# Patient Record
Sex: MALE | Race: WHITE
[De-identification: names, ages, dates, MRNs, and addresses within clinical notes are randomized per-mention and may not be internally consistent; named-entity substitution may affect disease eponyms.]

---

## 2021-02-07 ENCOUNTER — HOSPITAL ENCOUNTER (EMERGENCY)
Dept: HOSPITAL 61 - ER | Age: 81
LOS: 1 days | Discharge: HOME | End: 2021-02-08
Payer: MEDICARE

## 2021-02-07 VITALS — WEIGHT: 270.51 LBS | HEIGHT: 72 IN | BODY MASS INDEX: 36.64 KG/M2

## 2021-02-07 DIAGNOSIS — M25.552: Primary | ICD-10-CM

## 2021-02-07 DIAGNOSIS — I10: ICD-10-CM

## 2021-02-07 DIAGNOSIS — E66.9: ICD-10-CM

## 2021-02-07 LAB
ACETAMIN: < 2 MCG/ML (ref 10–30)
ALBUMIN SERPL-MCNC: 3.2 G/DL (ref 3.4–5)
ALBUMIN/GLOB SERPL: 0.9 {RATIO} (ref 1–1.7)
ALP SERPL-CCNC: 83 U/L (ref 46–116)
ALT SERPL-CCNC: 72 U/L (ref 16–63)
AMPHETAMINE/METHAMPHETAMINE: (no result)
ANION GAP SERPL CALC-SCNC: 7 MMOL/L (ref 6–14)
APTT PPP: YELLOW S
AST SERPL-CCNC: 43 U/L (ref 15–37)
BACTERIA #/AREA URNS HPF: 0 /HPF
BARBITURATES UR-MCNC: (no result) UG/ML
BASOPHILS # BLD AUTO: 0 X10^3/UL (ref 0–0.2)
BASOPHILS NFR BLD: 1 % (ref 0–3)
BENZODIAZ UR-MCNC: (no result) UG/L
BILIRUB SERPL-MCNC: 0.8 MG/DL (ref 0.2–1)
BILIRUB UR QL STRIP: NEGATIVE
BUN SERPL-MCNC: 28 MG/DL (ref 8–26)
BUN/CREAT SERPL: 23 (ref 6–20)
CALCIUM SERPL-MCNC: 8.8 MG/DL (ref 8.5–10.1)
CANNABINOIDS UR-MCNC: (no result) UG/L
CHLORIDE SERPL-SCNC: 110 MMOL/L (ref 98–107)
CO2 SERPL-SCNC: 26 MMOL/L (ref 21–32)
COCAINE UR-MCNC: (no result) NG/ML
CREAT SERPL-MCNC: 1.2 MG/DL (ref 0.7–1.3)
EOSINOPHIL NFR BLD: 0.1 X10^3/UL (ref 0–0.7)
EOSINOPHIL NFR BLD: 1 % (ref 0–3)
ERYTHROCYTE [DISTWIDTH] IN BLOOD BY AUTOMATED COUNT: 17.9 % (ref 11.5–14.5)
FIBRINOGEN PPP-MCNC: CLEAR MG/DL
GFR SERPLBLD BASED ON 1.73 SQ M-ARVRAT: 58.3 ML/MIN
GLUCOSE SERPL-MCNC: 114 MG/DL (ref 70–99)
HCT VFR BLD CALC: 39 % (ref 39–53)
HGB BLD-MCNC: 13 G/DL (ref 13–17.5)
LYMPHOCYTES # BLD: 0.6 X10^3/UL (ref 1–4.8)
LYMPHOCYTES NFR BLD AUTO: 7 % (ref 24–48)
MCH RBC QN AUTO: 30 PG (ref 25–35)
MCHC RBC AUTO-ENTMCNC: 33 G/DL (ref 31–37)
MCV RBC AUTO: 90 FL (ref 79–100)
METHADONE SERPL-MCNC: (no result) NG/ML
MONO #: 0.8 X10^3/UL (ref 0–1.1)
MONOCYTES NFR BLD: 9 % (ref 0–9)
NEUT #: 7.1 X10^3/UL (ref 1.8–7.7)
NEUTROPHILS NFR BLD AUTO: 82 % (ref 31–73)
NITRITE UR QL STRIP: NEGATIVE
OPIATES UR-MCNC: (no result) NG/ML
PCP SERPL-MCNC: (no result) MG/DL
PH UR STRIP: 6 [PH]
PLATELET # BLD AUTO: 126 X10^3/UL (ref 140–400)
POTASSIUM SERPL-SCNC: 4.3 MMOL/L (ref 3.5–5.1)
PROT SERPL-MCNC: 6.8 G/DL (ref 6.4–8.2)
PROT UR STRIP-MCNC: 100 MG/DL
RBC # BLD AUTO: 4.34 X10^6/UL (ref 4.3–5.7)
RBC #/AREA URNS HPF: (no result) /HPF (ref 0–2)
SODIUM SERPL-SCNC: 143 MMOL/L (ref 136–145)
UROBILINOGEN UR-MCNC: 1 MG/DL
WBC # BLD AUTO: 8.7 X10^3/UL (ref 4–11)
WBC #/AREA URNS HPF: 0 /HPF (ref 0–4)

## 2021-02-07 PROCEDURE — 73502 X-RAY EXAM HIP UNI 2-3 VIEWS: CPT

## 2021-02-07 PROCEDURE — 99285 EMERGENCY DEPT VISIT HI MDM: CPT

## 2021-02-07 PROCEDURE — 81001 URINALYSIS AUTO W/SCOPE: CPT

## 2021-02-07 PROCEDURE — 80053 COMPREHEN METABOLIC PANEL: CPT

## 2021-02-07 PROCEDURE — 85025 COMPLETE CBC W/AUTO DIFF WBC: CPT

## 2021-02-07 PROCEDURE — 36415 COLL VENOUS BLD VENIPUNCTURE: CPT

## 2021-02-07 PROCEDURE — G0480 DRUG TEST DEF 1-7 CLASSES: HCPCS

## 2021-02-07 PROCEDURE — 80307 DRUG TEST PRSMV CHEM ANLYZR: CPT

## 2021-02-07 PROCEDURE — 80329 ANALGESICS NON-OPIOID 1 OR 2: CPT

## 2021-02-07 NOTE — RAD
Exam: Left hip 2 views



INDICATION: Left hip pain



TECHNIQUE: Frontal view of pelvis with frontal and frog-leg lateral views left hip



Comparisons: None



FINDINGS:

Diffuse osteopenia. No acute or healed fractures. Soft tissues are unremarkable. Joint spaces are wel
l-maintained.



IMPRESSION:

No acute osseous abnormality. If the Patient is acutely unable to bear weight MRI to rule out occult 
hip fracture is recommended.



Electronically signed by: Beatrice Machuca MD (2/7/2021 11:30 PM) SYD

## 2021-02-07 NOTE — PHYS DOC
General Adult


EDM:


Chief Complaint:  HIP PAIN





HPI:


HPI:


79 yo M PMH HTN and obesity presents to the ed with c/o sharp, nonradiating left

hippain worsening for the past week, no relifef with hydrocodone x 3days. EMS 

reports pt was ambulatory at his home, in no distress. Pt reports no trauma or 

history of trauma. 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 On no AC, only 4 bp medications

(I reviewed these bottles at bedside).  No history of IV drug use, alcohol abuse

or immunocompromise state including diabetes or chronic steroid use.  Denies any

associated fever or chills, swollen joints, urethral discharge, dysuria or 

hematuria, saddle anesthesia, urinary or bowel retention or incontinence.





Review of Systems:


Review of Systems:


Constitutional:   Denies fever or chills. []


Eyes:   Denies change in visual acuity. []


HENT:   Denies nasal congestion or sore throat. [] 


Respiratory:   Denies cough or shortness of breath. [] 


Cardiovascular:   Denies chest pain or edema. [] 


GI:   Denies abdominal pain, nausea, vomiting, bloody stools or diarrhea. [] 


:  Denies dysuria. [] 


Musculoskeletal:   Denies back pain or joint swelling


Integument:   Denies rash. [] 


Neurologic:   Denies headache, focal weakness or sensory changes. [] 


Endocrine:   Denies polyuria or polydipsia. [] 


Lymphatic:  Denies swollen glands. [] 


Psychiatric:  Denies depression or anxiety. []





Heart Score:


Risk Factors:


Risk Factors:  DM, Current or recent (<one month) smoker, HTN, HLP, family 

history of CAD, obesity.


Risk Scores:


Score 0 - 3:  2.5% MACE over next 6 weeks - Discharge Home


Score 4 - 6:  20.3% MACE over next 6 weeks - Admit for Clinical Observation


Score 7 - 10:  72.7% MACE over next 6 weeks - Early Invasive Strategies





Physical Exam:


PE:





Constitutional: Well developed, well nourished, no acute distress/comfortable 

appearing, non-toxic appearance, obese


HENT: Normocephalic, atraumatic,


Eyes: EOMI, conjunctiva normal, no discharge.  


Neck: Normal range of motion,  supple, 


Cardiovascular: S1/2 present, regular rhythm


Lungs & Thorax: Speaking in full sentences, bilateral equal chest rise, no 

tachypnea or increased work of breathing


Abdomen:  soft, no tenderness, obese


Skin: Warm, dry, no erythema, no rash. [] 


Back: No midline tenderness, no CVA tenderness. [] 


Extremities: No focal or reproducible tenderness, no cyanosis, no deformity, 

straight leg negative bilaterally, patient rolls on both sides, no distress from

 lying left lateral recumbent, bl 1/4 edema, normal DP/PT pulses, cap refill < 1

 second


Neurologic: Alert and oriented X 3, normal motor function, normal sensory 

function, no focal deficits noted. []


Psychologic: Affect normal, judgement normal, mood normal. []





EKG:


EKG:


[]





Radiology/Procedures:


Radiology/Procedures:


[]IMAGING REPORT





                                     Signed





PATIENT: JESSICA REYNOSO ACCOUNT: [id number]     MRN#: [id number]


: 1940           LOCATION: ER              AGE: 80


SEX: M                    EXAM DT: 21         ACCESSION#: [id number]


STATUS: PRE ER            ORD. PHYSICIAN: AQUILES AGUILAR DO


REASON: left hip pain


PROCEDURE: HIP LEFT 2V WITH PELVIS





Exam: Left hip 2 views





INDICATION: Left hip pain





TECHNIQUE: Frontal view of pelvis with frontal and frog-leg lateral views left 

hip





Comparisons: None





FINDINGS:


Diffuse osteopenia. No acute or healed fractures. Soft tissues are unremarkable.

 Joint spaces are well-maintained.





IMPRESSION:


No acute osseous abnormality. If the Patient is acutely unable to bear weight 

MRI to rule out occult hip fracture is recommended.





Electronically signed by: Beatrice Grace MD (2021 11:30 PM) [facility]














DICTATED and SIGNED BY:     BEATRICE GRACE MD


DATE:     21 [id number] 0





Course & Med Decision Making:


Course & Med Decision Making


Pertinent Labs and Imaging studies reviewed. (See chart for details)





Concern for atraumatic worsening left hip pain in an obese patient who is able 

to bear weight and lean on left hip.  Labs with no leukocytosis.  Patient 

afebrile.  Nonspecific transaminitis.  Normal renal function.  Drug screen 

positive for opioids, consistent with history.  Urinalysis with no infection.  

Patient with no neurologic deficits, saddle anesthesia, radiculopathy, urine or 

bowel retention or incontinence.  Patient in no active discomfort.  Will treat 

conservatively with anti-inflammatory medications.  Will discharge home with 

strict ED return precautions were given for saddle anesthesia, neurologic 

deficits, midline back pain, fever or urinary or bowel retention or 

incontinence.. Encouraged urgent outpatient follow-up with PMD and orthopedic 

surgery for definitive management, consider MRI imaging.  Life-threatening 

processes were considered but are low suspicion at this time, given history, 

physical exam and ED workup. Pt was educated on all prescription medications and

 adverse effects.  All patient's questions were answered and pt was stable at 

time of discharge.





Life/limb-threatening differential includes but is not limited to, avascular 

necrosis, septic arthritis, malignancy, compartment syndrome, 

fracture/ligamentous injury/overuse, decompression sickness, seronegative 

spondyloarthropathies, trauma including dislocation/fracture, Lyme disease, 

lupus, arthritis differentials, gout/pseudogout or decompression sickness. 





I spoken with the patient and her caregivers.  I explained the patient's conditi

on, diagnoses and treatment plan based on the information available to me at 

this time.  I have answered the patient and her caregiver's questions and 

addressed any concerns.  The patient and her caregivers have a good 

understanding of patient's diagnosis, condition and treatment plan as can be 

expected at this point.  Vital signs have been stable.  Patient's condition is 

stable and appropriate for discharge from the emergency department. 





Patient will pursue further outpatient evaluation with primary care physician or

 other designated or consulting physician as outlined in the discharge 

instructions.  The patient and/or caregivers are agreeable to this plan of care 

and follow-up instructions have been explained in detail.  The patient and/or 

caregivers have received these instructions in written form and have expressed 

an understanding of the discharge instructions.  The patient and/or caregivers 

are aware that any significant change of condition or worsening of symptoms 

should prompt immediate return to this or the closest emergency department or 

call to 911.





Gloria Disclaimer:


Dragon Disclaimer:


This electronic medical record was generated, in whole or in part, using a voice

 recognition dictation system.





Departure


Departure


Impression:  


   Primary Impression:  


   Left hip pain


Disposition:  01 DC HOME SELF CARE/HOMELESS


Condition:  STABLE


Referrals:  


ARISTEO JHAVERI MD


FOLLOW UP WITH FAMILY MEDICINE:





Family Medicine


Address:


[address] [facility] [address]


[place] [address]


Phone:


[phone]


Patient Instructions:  Hip Pain, Osteoarthritis





Additional Instructions:  


FOLLOW UP WITH ORTHOPEDICS: For further imaging starting/definitive management





Orthopaedic Sports Medicine


Orthopaedic Surgery


[facility]


Address:


[address]


[place] [address]


Phone:


[phone]





EMERGENCY DEPARTMENT GENERAL DISCHARGE INSTRUCTIONS





Thank you for coming to [facility] Emergency Department (ED) 

today and 


trusting us with you care.  We trust that you had a positive experience in our 

Emergency 


Department.  If you wish to speak to the department management, you may call the

 Director at 


[phone].





YOUR FOLLOW UP INSTRUCTIONS ARE AS FOLLOWS:





1.  Do you have a private Doctor?  If you do not have a private doctor, please 

ask for a 


resource list of physicians or clinics that may be able to assist you with 

follow up care.





2.  The Emergency Physicain has interpreted your x-rays.  The X-Ray specialist 

will also 


review them.  If there is a change in the findings, you will be notified in 48 

hours when at 


all possible.





3.  A lab test or culture has been done, your results will be reviewed and you 

will be 


notified if you need a change in treatment.





ADDITIONAL INSTRUCTIONS AND INFORMATION:





1.  Your care today has been supervised by a physician who is specially trained 

in emergency 


care.  Many problems require more than one evaluation for a complete diagnosis 

and 


treatment.  We recommend that you schedule your follow up appointment as 

recommended to 


ensure complete treatment of you illness or injury.  If you are unable to obtain

 follow up 


care and continue to have a problem, or if your condition worsens, we recommend 

that you 


return to the ED.





2.  We are not able to safely determine your condition over the phone nor are we

 able to 


give sound medical advice over the phone.  For these safety reasons, if you call

 for medical 


advice we will ask you to come to the ED for further evaluation.





3.  If you have any questions regarding these discharge instructions please call

 the ED at 


[phone].





SAFETY INFORMATION:





In the interest of safety, wellness, and injury prevention; we encourage you to 

wear your 


sealbelt, if you smoke; quite smoking, and we encourage family to use a 

protective helmet 


for bicycling and other sporting events that present an increased risk for head 

injury.





IF YOUR SYMPTOMS WORSEN OR NEW SYMPTOMS DEVELOP, OR YOU HAVE CONCERNS ABOUT YOUR

 CONDITION; 


OR IF YOUR CONDITION WORSENS WHILE YOU ARE WAITING FOR YOUR FOLLOW UP 

APPOINTMENT; EITHER 


CONTACT YOUR PRIMARY CARE DOCTOR, THE PHYSICIAN WHOSE NAME AND NUMBER YOU WERE 

GIVEN, OR 


RETURN TO THE ED IMMEDIATELY.


Scripts


Naproxen (NAPROSYN) 500 Mg Tablet


1 TAB PO BID for pain for 10 Days, #20 TAB


   Prov: AQUILES AGUILAR DO         21











AQUILES AGUILAR DO                2021 22:30

## 2021-02-08 VITALS — DIASTOLIC BLOOD PRESSURE: 98 MMHG | SYSTOLIC BLOOD PRESSURE: 202 MMHG
